# Patient Record
Sex: MALE | Race: WHITE
[De-identification: names, ages, dates, MRNs, and addresses within clinical notes are randomized per-mention and may not be internally consistent; named-entity substitution may affect disease eponyms.]

---

## 2018-11-04 ENCOUNTER — HOSPITAL ENCOUNTER (EMERGENCY)
Dept: HOSPITAL 88 - ER | Age: 43
LOS: 1 days | Discharge: HOME | End: 2018-11-05
Payer: COMMERCIAL

## 2018-11-04 VITALS — BODY MASS INDEX: 42.66 KG/M2 | WEIGHT: 315 LBS | HEIGHT: 72 IN

## 2018-11-04 DIAGNOSIS — R07.2: Primary | ICD-10-CM

## 2018-11-04 DIAGNOSIS — I10: ICD-10-CM

## 2018-11-04 DIAGNOSIS — R94.31: ICD-10-CM

## 2018-11-04 DIAGNOSIS — I44.7: ICD-10-CM

## 2018-11-04 DIAGNOSIS — E66.9: ICD-10-CM

## 2018-11-04 DIAGNOSIS — E11.9: ICD-10-CM

## 2018-11-04 LAB
ALBUMIN SERPL-MCNC: 4 G/DL (ref 3.5–5)
ALBUMIN/GLOB SERPL: 1.4 {RATIO} (ref 0.8–2)
ALP SERPL-CCNC: 48 IU/L (ref 40–150)
ALT SERPL-CCNC: 38 IU/L (ref 0–55)
ANION GAP SERPL CALC-SCNC: 13.8 MMOL/L (ref 8–16)
BASOPHILS # BLD AUTO: 0.1 10*3/UL (ref 0–0.1)
BASOPHILS NFR BLD AUTO: 1 % (ref 0–1)
BUN SERPL-MCNC: 13 MG/DL (ref 7–26)
BUN/CREAT SERPL: 15 (ref 6–25)
CALCIUM SERPL-MCNC: 9.8 MG/DL (ref 8.4–10.2)
CHLORIDE SERPL-SCNC: 103 MMOL/L (ref 98–107)
CK MB SERPL-MCNC: 1.7 NG/ML (ref 0–5)
CK SERPL-CCNC: 231 IU/L (ref 30–200)
CO2 SERPL-SCNC: 25 MMOL/L (ref 22–29)
DEPRECATED APTT PLAS QN: 33.1 SECONDS (ref 23.8–35.5)
DEPRECATED INR PLAS: 0.94
DEPRECATED NEUTROPHILS # BLD AUTO: 5.8 10*3/UL (ref 2.1–6.9)
EGFRCR SERPLBLD CKD-EPI 2021: > 60 ML/MIN (ref 60–?)
EOSINOPHIL # BLD AUTO: 0.2 10*3/UL (ref 0–0.4)
EOSINOPHIL NFR BLD AUTO: 2.1 % (ref 0–6)
ERYTHROCYTE [DISTWIDTH] IN CORD BLOOD: 12.3 % (ref 11.7–14.4)
GLOBULIN PLAS-MCNC: 2.9 G/DL (ref 2.3–3.5)
GLUCOSE SERPLBLD-MCNC: 79 MG/DL (ref 74–118)
HCT VFR BLD AUTO: 42.9 % (ref 38.2–49.6)
HGB BLD-MCNC: 14.5 G/DL (ref 14–18)
LYMPHOCYTES # BLD: 2.8 10*3/UL (ref 1–3.2)
LYMPHOCYTES NFR BLD AUTO: 28.4 % (ref 18–39.1)
MCH RBC QN AUTO: 30 PG (ref 28–32)
MCHC RBC AUTO-ENTMCNC: 33.8 G/DL (ref 31–35)
MCV RBC AUTO: 88.8 FL (ref 81–99)
MONOCYTES # BLD AUTO: 1 10*3/UL (ref 0.2–0.8)
MONOCYTES NFR BLD AUTO: 9.8 % (ref 4.4–11.3)
NEUTS SEG NFR BLD AUTO: 58.3 % (ref 38.7–80)
PLATELET # BLD AUTO: 261 X10E3/UL (ref 140–360)
POTASSIUM SERPL-SCNC: 3.8 MMOL/L (ref 3.5–5.1)
PROTHROMBIN TIME: 13.4 SECONDS (ref 11.9–14.5)
RBC # BLD AUTO: 4.83 X10E6/UL (ref 4.3–5.7)
SODIUM SERPL-SCNC: 138 MMOL/L (ref 136–145)

## 2018-11-04 PROCEDURE — 82553 CREATINE MB FRACTION: CPT

## 2018-11-04 PROCEDURE — 82550 ASSAY OF CK (CPK): CPT

## 2018-11-04 PROCEDURE — 85025 COMPLETE CBC W/AUTO DIFF WBC: CPT

## 2018-11-04 PROCEDURE — 36415 COLL VENOUS BLD VENIPUNCTURE: CPT

## 2018-11-04 PROCEDURE — 80053 COMPREHEN METABOLIC PANEL: CPT

## 2018-11-04 PROCEDURE — 85730 THROMBOPLASTIN TIME PARTIAL: CPT

## 2018-11-04 PROCEDURE — 71045 X-RAY EXAM CHEST 1 VIEW: CPT

## 2018-11-04 PROCEDURE — 84484 ASSAY OF TROPONIN QUANT: CPT

## 2018-11-04 PROCEDURE — 85610 PROTHROMBIN TIME: CPT

## 2018-11-04 PROCEDURE — 93005 ELECTROCARDIOGRAM TRACING: CPT

## 2018-11-04 PROCEDURE — 99284 EMERGENCY DEPT VISIT MOD MDM: CPT

## 2018-11-04 NOTE — DIAGNOSTIC IMAGING REPORT
EXAM: CHEST SINGLE (PORTABLE), AP 1 view

INDICATION: Chest pain

COMPARISON: None



FINDINGS:

LINES/TUBES: None



LUNGS: No consolidations or edema. 



PLEURA: No effusions or pneumothorax.



HEART AND MEDIASTINUM: Normal size and contour.



BONES AND SOFT TISSUES: No acute findings. 



IMPRESSION:

No acute thoracic abnormality.







Signed by: Dr. Giuliana Harry M.D. on 11/4/2018 9:22 PM

## 2018-11-04 NOTE — XMS REPORT
Clinical Summary

                             Created on: 2018



Ehsan Paulino

External Reference #: [id number]

: 1975

Sex: Male



Demographics







                          Address                   [address] 

[place]  [address]

 

                          Home Phone                [phone]

 

                          Preferred Language        English

 

                          Marital Status            Unknown

 

                          Sikhism Affiliation     None

 

                          Race                      White

 

                          Ethnic Group              /Latin





Author







                          Author                    [facility]

 

                          Address                   Unknown

 

                          Phone                     Unavailable







Support







                Name            Relationship    Address         Phone

 

                    Gaby Saucedo        [employer]                [address]

[place]  [address]                   [phone]







Care Team Providers







                    Care Team Member Name    Role                Phone

 

                    Sharpless           PCP                 [phone]







Allergies

No Known Allergies



Medications







                          End Date                  Status



              Medication     Sig          Dispensed     Refills      Start  



                                         Date  

 

                                                    Active



                     metFORMIN (GLUCOPHAGE)     Take 500 mg         0   



                           500 MG tablet             by mouth 2     



                                         (two) times     



                                         daily with     



                                         breakfast and     



                                         dinner.     

 

                                                    Active



                     hydrochlorothiazide     Take 25 mg by       0   



                           (HYDRODIURIL) 25 MG       mouth daily.     



                                         tablet      

 

                                                    Active



                     lisinopril          Take 20 mg by       0   



                           (PRINIVIL,ZESTRIL) 20 MG     mouth daily.     



                                         tablet      

 

                                                    Active



                     carvedilol (COREG) 6.25     Take 6.25 mg        0   



                           MG tablet                 by mouth 2     



                                         (two) times     



                                         daily with     



                                         breakfast and     



                                         dinner.     

 

                                                    Active



              insulin glargine (LANTUS)     Inject 40     10 mL        0            10/04/201  



                     100 unit/mL injection     Units               6  



                                         subcutaneousl     



                                         y nightly Use     



                                         as directed.     

 

                                                    Active



              insulin lispro (HUMALOG)     Take 12units     12 mL        0            10/04/201  



                     100 unit/mL InPn     before              6  



                                         meals.Plus,     



                                         if BS <200,     



                                         No Insulin.     



                                         If 201 - 250,     



                                         add 1 unit.     



                                         251 - 300, 2     



                                         units. 301 -     



                                         350,3 units.     



                                         351 - 400,     



                                         4units.     

 

                                                    Active



              BD Ultra-Fine Anamaria     Use as       100 each     1            10/04/201  



                     Insulin Pen Needles 4 mm     directed..          6  



                                         x 32 G      







Active Problems







 



                           Problem                   Noted Date

 

 



                           Acute respiratory failure with hypoxia     2016

 

 



                           Left upper lobe pneumonia     2016

 

 



                           Hemoptysis                2016

 

 



                           HTN (hypertension)        2016

 

 



                           Diabetes mellitus         2016

 

 



                           Pneumonia of left upper lobe due to infectious organism     2016

 

 



                           Tobacco abuse             2016

 

 



                           Obesity                   2016

 

 



                                         Overview:



                                         Body mass index is 46.1 kg/(m^2).

 

 



                           Abnormal LFTs             2016

 

 



                           Sepsis                    2016







Immunizations







  



                     Name                Dates Previously Given     Next Due

 

  



                           Influenza Three-TIV PF 5+     10/04/2016 



                                         YR  







Family History







   



                 Medical History     Relation        Name            Comments

 

   



                     Cancer              Maternal            colon



                                         Grandfather  

 

   



                           Hyperlipidemia            Maternal  



                                         Grandmother  









   



                 Relation        Name            Status          Comments

 

   



                                         Maternal Grandfather   

 

   



                                         Maternal Grandmother   







Social History







                                        Date



                 Tobacco Use     Types           Packs/Day       Years Used 

 

                                         



                           Current Every Day Smoker      1  









   



                 Alcohol Use     Drinks/Week     oz/Week         Comments

 

   



                           Yes                       socially









 



                           Sex Assigned at Birth     Date Recorded

 

 



                                         Not on file 









                                        Industry



                           Job Start Date            Occupation 

 

                                        Not on file



                           Not on file               Not on file 









                                        Travel End



                           Travel History            Travel Start 

 





                                         No recent travel history available.







Last Filed Vital Signs

Not on file



Plan of Treatment





Not on file



Results

Not on fileafter 2017



Insurance







     



            Payer      Benefit     Subscriber ID     Type       Phone      Address



                                         Plan /    



                                         Group    

 

     



            [employer]     [employer] PPO     xxxxxxxxxxxx     PPO        [phone]     [address] 

[id number]



                           [place]                   [place] [address]



                                         CHOICE    









     



            Guarantor Name     Account     Relation to     Date of     Phone      Billing Address



                     Type                Patient             Birth  

 

     



            Ehsan Paulino     Personal/F     Self       1975     [phone]     [address]



                     [facility]               (Home)              [address] [place] [id number]







Advance Directives





For more information, please contact:



[facility]



[address] [place] [address] [phone]









                          Date Inactivated          Comments



                           Code Status               Date Activated  

 

                          10/4/2016  7:21 PM         



                           Full Code                 2016  5:28 PM  









  



                           This code status was determined by:     Patient

## 2018-11-05 VITALS — SYSTOLIC BLOOD PRESSURE: 126 MMHG | DIASTOLIC BLOOD PRESSURE: 82 MMHG

## 2018-11-05 LAB
CK MB SERPL-MCNC: 1.5 NG/ML (ref 0–5)
CK SERPL-CCNC: 194 IU/L (ref 30–200)